# Patient Record
Sex: MALE | Race: WHITE
[De-identification: names, ages, dates, MRNs, and addresses within clinical notes are randomized per-mention and may not be internally consistent; named-entity substitution may affect disease eponyms.]

---

## 2021-09-27 ENCOUNTER — HOSPITAL ENCOUNTER (EMERGENCY)
Dept: HOSPITAL 11 - JP.ED | Age: 56
Discharge: HOME | End: 2021-09-27
Payer: COMMERCIAL

## 2021-09-27 DIAGNOSIS — Z79.899: ICD-10-CM

## 2021-09-27 DIAGNOSIS — K21.9: ICD-10-CM

## 2021-09-27 DIAGNOSIS — I48.91: Primary | ICD-10-CM

## 2021-09-27 PROCEDURE — 80053 COMPREHEN METABOLIC PANEL: CPT

## 2021-09-27 PROCEDURE — 84484 ASSAY OF TROPONIN QUANT: CPT

## 2021-09-27 PROCEDURE — 83605 ASSAY OF LACTIC ACID: CPT

## 2021-09-27 PROCEDURE — 84443 ASSAY THYROID STIM HORMONE: CPT

## 2021-09-27 PROCEDURE — 85730 THROMBOPLASTIN TIME PARTIAL: CPT

## 2021-09-27 PROCEDURE — 96375 TX/PRO/DX INJ NEW DRUG ADDON: CPT

## 2021-09-27 PROCEDURE — 71045 X-RAY EXAM CHEST 1 VIEW: CPT

## 2021-09-27 PROCEDURE — 93005 ELECTROCARDIOGRAM TRACING: CPT

## 2021-09-27 PROCEDURE — 85025 COMPLETE CBC W/AUTO DIFF WBC: CPT

## 2021-09-27 PROCEDURE — 99285 EMERGENCY DEPT VISIT HI MDM: CPT

## 2021-09-27 PROCEDURE — 85610 PROTHROMBIN TIME: CPT

## 2021-09-27 PROCEDURE — 96365 THER/PROPH/DIAG IV INF INIT: CPT

## 2021-09-27 PROCEDURE — 36415 COLL VENOUS BLD VENIPUNCTURE: CPT

## 2021-09-27 PROCEDURE — 96366 THER/PROPH/DIAG IV INF ADDON: CPT

## 2021-09-27 NOTE — CR
CHEST: Portable 9/27/2021 at 8:27 AM

 

CLINICAL HISTORY:Chest pain

 

COMPARISON:None

 

FINDINGS:  The heart size, pulmonary vascularity and hilar structures are

normal. No infiltrate effusion or pneumothorax is seen.

 

IMPRESSION: No acute cardiopulmonary process.

## 2021-09-27 NOTE — EDM.PDOC
ED HPI GENERAL MEDICAL PROBLEM





- General


Chief Complaint: Cardiovascular Problem


Stated Complaint: HEART ISSUES,ACID REFLUX


Time Seen by Provider: 09/27/21 08:11


Source of Information: Reports: Patient, RN Notes Reviewed


History Limitations: Reports: No Limitations





- History of Present Illness


INITIAL COMMENTS - FREE TEXT/NARRATIVE: 





56-year-old gentleman presents emergency department day complaint of 

palpitations, he has a known history of reflux he is not sure exactly when this 

happened he thinks it has been going on for 3 days but it got exceptionally 

worse this morning palpitations increased significantly to the point where he is

having chest discomfort.  No nausea no vomiting no shortness of breath he is 

never had any heart issues he states he did have an issue with a racing heart 

several years ago but he is not sure of the exact details.  He is visiting here 

from the [facility]





- Related Data


                                    Allergies











Allergy/AdvReac Type Severity Reaction Status Date / Time


 


No Known Allergies Allergy   Verified 09/27/21 07:51











Home Meds: 


                                    Home Meds





Diltiazem IR [Cardizem] 60 mg PO Q6H #30 tab 09/27/21 [Rx]


Omeprazole 20 mg PO DAILY 09/27/21 [History]











Past Medical History


Gastrointestinal History: Reports: GERD





Social & Family History





- Tobacco Use


Tobacco Use Status *Q: Never Tobacco User


Second Hand Smoke Exposure: No





- Caffeine Use


Caffeine Use: Reports: Soda





- Recreational Drug Use


Recreational Drug Use: No





ED ROS GENERAL





- Review of Systems


Review Of Systems: See Below


Constitutional: Reports: No Symptoms


HEENT: Reports: No Symptoms


Respiratory: Reports: No Symptoms


Cardiovascular: Reports: Chest Pain (Chest discomfort), Palpitations


GI/Abdominal: Reports: No Symptoms





ED EXAM, GENERAL





- Physical Exam


Exam: See Below


Exam Limited By: No Limitations


General Appearance: Alert, WD/WN, No Apparent Distress


Respiratory/Chest: No Respiratory Distress, Lungs Clear, Normal Breath Sounds, 

No Accessory Muscle Use, Chest Non-Tender


Cardiovascular: Tachycardia


GI/Abdominal: Soft, Non-Tender


Extremities: No Pedal Edema


  ** #1 Interpretation


EKG Date: 09/27/21


Time: 08:33


Rhythm: A-Fib


Axis: Normal


P-Wave: Absent


QRS: Normal


ST-T: Normal


QT: Normal


Comparison: NA - No Prior EKG





Course





- Vital Signs


Last Recorded V/S: 


                                Last Vital Signs











Temp  97.1 F   09/27/21 07:54


 


Pulse  85   09/27/21 09:22


 


Resp  10 L  09/27/21 09:22


 


BP  137/80   09/27/21 09:22


 


Pulse Ox  97   09/27/21 09:22














- Orders/Labs/Meds


Orders: 


                               Active Orders 24 hr











 Category Date Time Status


 


 Cardiac Monitoring [RC] .As Directed Care  09/27/21 08:12 Active


 


 Peripheral IV Care [RC] .AS DIRECTED Care  09/27/21 08:13 Active


 


 Diltiazem [Cardizem] 100 mg Med  09/27/21 08:15 Active





 Sodium Chloride 0.9% [Normal Saline] 100 ml   





 IV TITRATE   


 


 Diltiazem [Cardizem] 100 mg Med  09/27/21 14:45 Active





 Sodium Chloride 0.9% [Normal Saline] 100 ml   





 IV TITRATE   


 


 Sodium Chloride 0.9% [Normal Saline] 1,000 ml Med  09/27/21 08:15 Active





 IV ASDIRECTED   


 


 Sodium Chloride 0.9% [Saline Flush] Med  09/27/21 08:11 Active





 10 ml FLUSH ASDIRECTED PRN   


 


 ED Antiarrhythmia Med Reflex [OM.PC] Stat Oth  09/27/21 08:12 Ordered


 


 Peripheral IV Insertion Adult [OM.PC] Stat Oth  09/27/21 08:11 Ordered


 


 EKG 12 Lead [EK] Stat Ther  09/27/21 08:13 Ordered








                                Medication Orders





Sodium Chloride (Normal Saline)  1,000 mls @ 125 mls/hr IV ASDIRECTED DEON


   Last Admin: 09/27/21 08:19  Dose: 125 mls/hr


   Documented by: MARYAN


Diltiazem HCl 100 mg/ Sodium (Chloride)  100 mls @ 5 mls/hr IV TITRATE DEON; 

Protocol


   Last Titration: 09/27/21 10:08  Dose: 15 mg/hr, 15 mls/hr


   Documented by: MARYAN


   Titration: 09/27/21 08:56  Dose: 10 mg/hr, 10 mls/hr


   Documented by: MARYAN


   Admin: 09/27/21 08:19  Dose: 5 mg/hr, 5 mls/hr


   Documented by: MARYAN


Diltiazem HCl 100 mg/ Sodium (Chloride)  100 mls @ 5 mls/hr IV TITRATE DEON; 

Protocol


   Last Titration: 09/27/21 16:41  Dose: 0 mg/hr, 0 mls/hr


   Documented by: MARYAN


   Titration: 09/27/21 15:39  Dose: 5 mg/hr, 5 mls/hr


   Documented by: MARYAN


   Titration: 09/27/21 14:46  Dose: 10 mg/hr, 10 mls/hr


   Documented by: MARYAN


   Admin: 09/27/21 14:46  Dose: 15 mg/hr, 15 mls/hr


   Documented by: MARYAN


Sodium Chloride (Sodium Chloride 0.9% 10 Ml Syringe)  10 ml FLUSH ASDIRECTED PRN


   PRN Reason: Keep Vein Open


   Last Admin: 09/27/21 08:35  Dose: 10 ml


   Documented by: MARYAN








Labs: 


                                Laboratory Tests











  09/27/21 09/27/21 09/27/21 Range/Units





  08:11 08:11 08:11 


 


WBC  7.0    (4.5-11.0)  K/uL


 


RBC  5.08    (4.30-5.90)  M/uL


 


Hgb  15.6 H    (12.0-15.0)  g/dL


 


Hct  46.2    (40.0-54.0)  %


 


MCV  91    (80-98)  fL


 


MCH  31    (27-31)  pg


 


MCHC  34    (32-36)  %


 


Plt Count  308    (150-400)  K/uL


 


Neut % (Auto)  49.8    (36-66)  %


 


Lymph % (Auto)  33.8    (24-44)  %


 


Mono % (Auto)  13.1 H    (2-6)  %


 


Eos % (Auto)  2.7    (2-4)  %


 


Baso % (Auto)  0.6    (0-1)  %


 


PT     (9.2-10.6)  sec


 


INR     


 


APTT     (21.4-31.8)  sec


 


Sodium   141   (140-148)  mmol/L


 


Potassium   3.8   (3.6-5.2)  mmol/L


 


Chloride   103   (100-108)  mmol/L


 


Carbon Dioxide   26   (21-32)  mmol/L


 


Anion Gap   12.1   (5.0-14.0)  mmol/L


 


BUN   19 H   (7-18)  mg/dL


 


Creatinine   1.1   (0.8-1.3)  mg/dL


 


Est Cr Clr Drug Dosing   74.09   mL/min


 


Estimated GFR (MDRD)   > 60   (>60)  


 


Glucose   106   ()  mg/dL


 


Lactic Acid    1.2  (0.4-2.0)  mmol/L


 


Calcium   8.6   (8.5-10.1)  mg/dL


 


Total Bilirubin   0.6   (0.2-1.0)  mg/dL


 


AST   21   (15-37)  U/L


 


ALT   39   (12-78)  U/L


 


Alkaline Phosphatase   89   ()  U/L


 


Troponin I   < 0.017   (0.000-0.056)  ng/mL


 


Total Protein   7.5   (6.4-8.2)  g/dL


 


Albumin   3.7   (3.4-5.0)  g/dL


 


Globulin   3.8 H   (2.3-3.5)  g/dL


 


Albumin/Globulin Ratio   1.0 L   (1.2-2.2)  


 


TSH, Ultra Sensitive     (0.358-3.740)  uIU/mL














  09/27/21 09/27/21 Range/Units





  08:13 08:34 


 


WBC    (4.5-11.0)  K/uL


 


RBC    (4.30-5.90)  M/uL


 


Hgb    (12.0-15.0)  g/dL


 


Hct    (40.0-54.0)  %


 


MCV    (80-98)  fL


 


MCH    (27-31)  pg


 


MCHC    (32-36)  %


 


Plt Count    (150-400)  K/uL


 


Neut % (Auto)    (36-66)  %


 


Lymph % (Auto)    (24-44)  %


 


Mono % (Auto)    (2-6)  %


 


Eos % (Auto)    (2-4)  %


 


Baso % (Auto)    (0-1)  %


 


PT   9.9  (9.2-10.6)  sec


 


INR   1.0  


 


APTT   29.3  (21.4-31.8)  sec


 


Sodium    (140-148)  mmol/L


 


Potassium    (3.6-5.2)  mmol/L


 


Chloride    (100-108)  mmol/L


 


Carbon Dioxide    (21-32)  mmol/L


 


Anion Gap    (5.0-14.0)  mmol/L


 


BUN    (7-18)  mg/dL


 


Creatinine    (0.8-1.3)  mg/dL


 


Est Cr Clr Drug Dosing    mL/min


 


Estimated GFR (MDRD)    (>60)  


 


Glucose    ()  mg/dL


 


Lactic Acid    (0.4-2.0)  mmol/L


 


Calcium    (8.5-10.1)  mg/dL


 


Total Bilirubin    (0.2-1.0)  mg/dL


 


AST    (15-37)  U/L


 


ALT    (12-78)  U/L


 


Alkaline Phosphatase    ()  U/L


 


Troponin I    (0.000-0.056)  ng/mL


 


Total Protein    (6.4-8.2)  g/dL


 


Albumin    (3.4-5.0)  g/dL


 


Globulin    (2.3-3.5)  g/dL


 


Albumin/Globulin Ratio    (1.2-2.2)  


 


TSH, Ultra Sensitive  2.247   (0.358-3.740)  uIU/mL











Meds: 


Medications











Generic Name Dose Route Start Last Admin





  Trade Name Freq  PRN Reason Stop Dose Admin


 


Sodium Chloride  1,000 mls @ 125 mls/hr  09/27/21 08:15  09/27/21 08:19





  Normal Saline  IV   125 mls/hr





  ASDIRECTED DEON   Administration


 


Diltiazem HCl 100 mg/ Sodium  100 mls @ 5 mls/hr  09/27/21 08:15  09/27/21 10:08





  Chloride  IV   15 mg/hr





  TITRATE DEON   15 mls/hr





    Titration





  Protocol  





  5 MG/HR  


 


Diltiazem HCl 100 mg/ Sodium  100 mls @ 5 mls/hr  09/27/21 14:45  09/27/21 16:41





  Chloride  IV   0 mg/hr





  TITRATE DEON   0 mls/hr





    Titration





  Protocol  





  5 MG/HR  


 


Sodium Chloride  10 ml  09/27/21 08:11  09/27/21 08:35





  Sodium Chloride 0.9% 10 Ml Syringe  FLUSH   10 ml





  ASDIRECTED PRN   Administration





  Keep Vein Open  














Discontinued Medications














Generic Name Dose Route Start Last Admin





  Trade Name Fredy  PRN Reason Stop Dose Admin


 


Adenosine  Confirm  09/27/21 07:57 





  Adenosine 6 Mg/2 Ml Sdv  Administered  09/27/21 07:58 





  Dose  





  18 mg  





  .ROUTE  





  .STK-MED ONE  


 


Adenosine  6 mg  09/27/21 08:32  09/27/21 08:34





  Adenosine 6 Mg/2 Ml Sdv  IVPUSH  09/27/21 08:33  6 mg





  NOW ONE   Administration


 


Adenosine  12 mg  09/27/21 08:32  09/27/21 08:04





  Adenosine 6 Mg/2 Ml Sdv  IVPUSH  09/27/21 08:33  12 mg





  NOW ONE   Administration


 


Aspirin  324 mg  09/27/21 08:11  09/27/21 08:22





  Aspirin 81 Mg Tab.Chew  PO  09/27/21 08:12  324 mg





  ONETIME ONE   Administration


 


Diltiazem HCl  Confirm  09/27/21 08:06 





  Diltiazem 25 Mg/5 Ml Sdv  Administered  09/27/21 08:07 





  Dose  





  25 mg  





  .ROUTE  





  .STK-MED ONE  


 


Diltiazem HCl  25 mg  09/27/21 08:11  09/27/21 08:20





  Diltiazem 25 Mg/5 Ml Sdv  IVPUSH  09/27/21 08:12  25 mg





  ONETIME ONE   Administration


 


Diltiazem HCl  60 mg  09/27/21 14:21  09/27/21 14:33





  Diltiazem Ir 30 Mg Tab  PO  09/27/21 14:22  60 mg





  ONETIME ONE   Administration


 


Ketorolac Tromethamine  30 mg  09/27/21 13:20  09/27/21 13:40





  Ketorolac 30 Mg/Ml Sdv  IVPUSH  09/27/21 13:21  30 mg





  ONETIME ONE   Administration














- Re-Assessments/Exams


Free Text/Narrative Re-Assessment/Exam: 





09/27/21 14:30


Calculated [id number] score which is 0 puts him at low risk I did recommend 

daily aspirin, because I cannot find any hospital bed in the area due to the 

pandemic plan is to start Cardizem 60 mg every 6 hours wean him off the Cardizem

 drip if he maintains his rate below 110 discharging home with Cardizem he will 

follow-up with his primary care upon return home with consultation to cardiology

 for further evaluation


09/27/21 16:02








Departure





- Departure


Time of Disposition: 16:53


Disposition: Home, Self-Care 01


Condition: Fair


Clinical Impression: 


 Atrial fibrillation with RVR





Prescriptions: 


Diltiazem IR [Cardizem] 60 mg PO Q6H #30 tab


Instructions:  Atrial Fibrillation, Atrial Fibrillation, Easy-to-Read


Referrals: 


PCP,None [Primary Care Provider] - 


Forms:  ED Department Discharge


Additional Instructions: 


Follow-up appointment with your primary care physician, return to the emergency 

department worsening today every 6 hours





Sepsis Event Note (ED)





- Focused Exam


Vital Signs: 


                                   Vital Signs











  Temp Pulse Resp BP Pulse Ox


 


 09/27/21 09:22   85  10 L  137/80  97


 


 09/27/21 08:57   95  13  120/81  95


 


 09/27/21 08:52   103 H  13  144/58 H  95


 


 09/27/21 08:22   96  26 H  139/80  95


 


 09/27/21 08:08   155 H  13  157/91 H  99


 


 09/27/21 07:54  97.1 F  160 H  24 H  146/92 H  98


 


 09/27/21 07:53  97.1 F  160 H  24 H  146/92 H  98














- My Orders


Last 24 Hours: 


My Active Orders





09/27/21 08:11


Sodium Chloride 0.9% [Saline Flush]   10 ml FLUSH ASDIRECTED PRN 


Peripheral IV Insertion Adult [OM.PC] Stat 





09/27/21 08:12


Cardiac Monitoring [RC] .As Directed 


ED Antiarrhythmia Med Reflex [OM.PC] Stat 





09/27/21 08:13


Peripheral IV Care [RC] .AS DIRECTED 


EKG 12 Lead [EK] Stat 





09/27/21 08:15


Diltiazem [Cardizem] 100 mg   Sodium Chloride 0.9% [Normal Saline] 100 ml IV 

TITRATE 


Sodium Chloride 0.9% [Normal Saline] 1,000 ml IV ASDIRECTED 





09/27/21 14:45


Diltiazem [Cardizem] 100 mg   Sodium Chloride 0.9% [Normal Saline] 100 ml IV 

TITRATE 














- Assessment/Plan


Last 24 Hours: 


My Active Orders





09/27/21 08:11


Sodium Chloride 0.9% [Saline Flush]   10 ml FLUSH ASDIRECTED PRN 


Peripheral IV Insertion Adult [OM.PC] Stat 





09/27/21 08:12


Cardiac Monitoring [RC] .As Directed 


ED Antiarrhythmia Med Reflex [OM.PC] Stat 





09/27/21 08:13


Peripheral IV Care [RC] .AS DIRECTED 


EKG 12 Lead [EK] Stat 





09/27/21 08:15


Diltiazem [Cardizem] 100 mg   Sodium Chloride 0.9% [Normal Saline] 100 ml IV 

TITRATE 


Sodium Chloride 0.9% [Normal Saline] 1,000 ml IV ASDIRECTED 





09/27/21 14:45


Diltiazem [Cardizem] 100 mg   Sodium Chloride 0.9% [Normal Saline] 100 ml IV 

TITRATE 











Plan: 





Assessment





Acuity = acute





Site and laterality = atrial fibrillation with RVR now in control





Etiology  = unknown





Manifestations = palpitations now resolved





Location of injury =  Home





Lab values = CBC, CMP, thyroid all within normal limits troponin was negative 

chest x-ray shows no acute process





Plan


He did well weaned off Cardizem drip we will continue the Cardizem 60 mg every 6

hours faxed to [facility] pharmacy he will follow-up with his primary care 

tomorrow

















 This note was dictated using dragon voice recognition software please call with

any questions on syntax or grammar.